# Patient Record
Sex: FEMALE | Race: WHITE | NOT HISPANIC OR LATINO | URBAN - METROPOLITAN AREA
[De-identification: names, ages, dates, MRNs, and addresses within clinical notes are randomized per-mention and may not be internally consistent; named-entity substitution may affect disease eponyms.]

---

## 2020-06-25 NOTE — PATIENT DISCUSSION
New Prescription: Lotemax (loteprednol etabonate): gel: 0.5% 1 drop three times a day into both eyes 06-

## 2020-08-04 NOTE — PATIENT DISCUSSION
DRY EYE WITH INFLAMMATION, IMPROVING: PRESERVATIVE FREE ARTIFICIAL TEARS Q2 HOURS, LID HYGIENE. TAPER LOTEMAX BID. CONSIDER RESTASIS OR PUNCTAL PLUGS AND/OR LIPIFLOW AT FOLLOW. RETURN SOONER IF SYMPTOMS WORSEN.

## 2021-01-08 NOTE — PATIENT DISCUSSION
Continue: Restasis (cyclosporine): dropperette: 0.05% 1 drop twice a day as directed into both eyes 12-

## 2021-01-08 NOTE — PATIENT DISCUSSION
New Prescription: Eysuvis (loteprednol etabonate): drops,suspension: 0.25% 1 drop four times a day into both eyes 01-

## 2021-01-08 NOTE — PATIENT DISCUSSION
Stopped Today: Lotemax (loteprednol etabonate): gel: 0.5% 1 drop three times a day into both eyes 06-

## 2021-02-05 NOTE — PATIENT DISCUSSION
Continue: Eysuvis (loteprednol etabonate): drops,suspension: 0.25% 1 drop four times a day as directed into both eyes 01-

## 2021-02-05 NOTE — PATIENT DISCUSSION
MODERATE DRY EYES: PRESCRIBED DISAPPEARING OTC PRESERVATIVE OR ARTIFICIAL TEARS BID-QID, OU AND THE DAILY INTAKE OF OMEGA-3 DHA/EPA FATTY ACIDS TO HELP RELIEVE SYMPTOMS. ADD NIGHTLY LUBRICATING OINTMENT OR GEL. CONTINUE Eysuvis OU. WILL CONSIDER PUNCTAL PLUGS AND/OR LIPIFLOW TREATMENT NEXT VISIT IF NOT RESPONSIVE OR IF SYMPTOMS PERSIST. RETURN FOR FOLLOW-UP AS SCHEDULED OR SOONER IF SYMPTOMS WORSEN.

## 2021-03-12 NOTE — PATIENT DISCUSSION
Stopped Today: Restasis (cyclosporine): dropperette: 0.05% 1 drop twice a day as directed into both eyes 12-

## 2021-05-14 NOTE — PATIENT DISCUSSION
POSTERIOR VITREOUS DETACHMENT, OU_: PATIENT EDUCATION GIVEN. NO HOLES. NO TEARS. RETURN FOR FOLLOW-UP AS SCHEDULED FOR FURTHER OCT TESTING AND EVALUATION.

## 2021-06-23 ENCOUNTER — NEW PATIENT COMPREHENSIVE (OUTPATIENT)
Dept: URBAN - METROPOLITAN AREA CLINIC 50 | Facility: CLINIC | Age: 71
End: 2021-06-23

## 2021-06-23 DIAGNOSIS — H25.812: ICD-10-CM

## 2021-06-23 DIAGNOSIS — H25.11: ICD-10-CM

## 2021-06-23 DIAGNOSIS — H35.373: ICD-10-CM

## 2021-06-23 DIAGNOSIS — H43.812: ICD-10-CM

## 2021-06-23 PROCEDURE — 92004 COMPRE OPH EXAM NEW PT 1/>: CPT

## 2021-06-23 PROCEDURE — 92134 CPTRZ OPH DX IMG PST SGM RTA: CPT

## 2021-06-23 ASSESSMENT — VISUAL ACUITY
OS_GLARE: 20/50
OS_GLARE: 20/80
OU_SC: 20/70
OD_GLARE: 20/70
OS_CC: 20/40
OD_GLARE: 20/40
OS_SC: 20/70+1
OD_CC: 20/40
OD_SC: 20/80
OD_PH: 20/60+1
OS_PH: 20/30-2
OU_CC: 20/40+2
OU_CC: J2@14IN

## 2021-06-23 ASSESSMENT — TONOMETRY
OD_IOP_MMHG: 17
OS_IOP_MMHG: 18

## 2021-07-15 ENCOUNTER — BIOMETRY (OUTPATIENT)
Dept: URBAN - METROPOLITAN AREA CLINIC 50 | Facility: CLINIC | Age: 71
End: 2021-07-15

## 2021-07-15 DIAGNOSIS — H25.11: ICD-10-CM

## 2021-07-15 PROCEDURE — 92136 OPHTHALMIC BIOMETRY: CPT

## 2021-07-15 ASSESSMENT — KERATOMETRY
OS_K2POWER_DIOPTERS: 42.62
OD_K2POWER_DIOPTERS: 41.62
OS_K1POWER_DIOPTERS: 43.12
OS_AXISANGLE_DEGREES: 150
OS_AXISANGLE2_DEGREES: 60
OD_K1POWER_DIOPTERS: 43.12
OD_AXISANGLE_DEGREES: 25
OD_AXISANGLE2_DEGREES: 115

## 2022-12-09 NOTE — PATIENT DISCUSSION
CONSIDER SURGERY: Visually and functionally significant. Discussed the r/b/a of surgery and pt wishes to wait at this time. Will need clearance from Dr. Kely Gutierres then can schedule with Dr. Ralf Helm.